# Patient Record
Sex: FEMALE | Race: BLACK OR AFRICAN AMERICAN | NOT HISPANIC OR LATINO | Employment: FULL TIME | URBAN - METROPOLITAN AREA
[De-identification: names, ages, dates, MRNs, and addresses within clinical notes are randomized per-mention and may not be internally consistent; named-entity substitution may affect disease eponyms.]

---

## 2017-01-17 ENCOUNTER — TELEPHONE (OUTPATIENT)
Dept: OBSTETRICS AND GYNECOLOGY | Facility: CLINIC | Age: 22
End: 2017-01-17

## 2017-01-17 NOTE — TELEPHONE ENCOUNTER
----- Message from Bhavani Marsh sent at 1/17/2017  3:45 PM CST -----  Contact: self [phone]  States that she is returning call please call back at [phone]//thank you acc

## 2017-01-30 ENCOUNTER — TELEPHONE (OUTPATIENT)
Dept: OBSTETRICS AND GYNECOLOGY | Facility: CLINIC | Age: 22
End: 2017-01-30

## 2017-01-30 ENCOUNTER — CLINICAL SUPPORT (OUTPATIENT)
Dept: OBSTETRICS AND GYNECOLOGY | Facility: CLINIC | Age: 22
End: 2017-01-30
Payer: COMMERCIAL

## 2017-01-30 PROCEDURE — 96372 THER/PROPH/DIAG INJ SC/IM: CPT | Mod: S$GLB,,, | Performed by: ADVANCED PRACTICE MIDWIFE

## 2017-01-30 RX ADMIN — MEDROXYPROGESTERONE ACETATE 150 MG: 150 INJECTION, SUSPENSION INTRAMUSCULAR at 02:01

## 2017-01-30 NOTE — TELEPHONE ENCOUNTER
Pt is requesting if she can be worked in sooner than first available, states she is still having pain from a procedure she had done and that her urine is still having a strong odor, please call pt.

## 2017-01-30 NOTE — PROGRESS NOTES
Two pt identifiers verified  pt notified to wait in clinic for 15 minutes after receiving injection, pt verbalized understanding  150mg/mL of Depo Provera administered IM to pt's left ventrogluteal.   Pt tolerated well  Next appointment scheduled for 04/24/2017 at 1045 with Catalina Hollins to discuss continuation of depo provera.

## 2017-03-06 ENCOUNTER — TELEPHONE (OUTPATIENT)
Dept: OBSTETRICS AND GYNECOLOGY | Facility: CLINIC | Age: 22
End: 2017-03-06

## 2017-03-06 NOTE — TELEPHONE ENCOUNTER
----- Message from Yanci Contreras sent at 3/3/2017  4:25 PM CST -----  Contact: [phone]   Pt would like a referral sent to [facility]rolog[facility].  Please call back @ [phone].  Thanks-[facility]

## 2017-03-06 NOTE — TELEPHONE ENCOUNTER
"Patient stated "we are playing around with her health and she wants to see someone else at [facility] and wants her records."  Patient advised to call [facility] and request an appointment and that they could request her records from our medical records department and that we do not send out records from the clinic.  Patient verbalized understanding.  "

## 2017-03-06 NOTE — TELEPHONE ENCOUNTER
----- Message from Aarti Montero sent at 3/6/2017  3:33 PM CST -----  Contact: Pt   Pt called and stated she was returning a call to the nurse. She stated she needs to get the info for the referral to [facility]. She can be reached at [phone] (home).    Thanks,  TF

## 2017-04-23 ENCOUNTER — HOSPITAL ENCOUNTER (EMERGENCY)
Facility: HOSPITAL | Age: 22
Discharge: HOME | End: 2017-04-23
Payer: COMMERCIAL

## 2017-04-23 VITALS
WEIGHT: 154 LBS | OXYGEN SATURATION: 99 % | BODY MASS INDEX: 26.29 KG/M2 | TEMPERATURE: 98 F | RESPIRATION RATE: 20 BRPM | HEART RATE: 71 BPM | DIASTOLIC BLOOD PRESSURE: 68 MMHG | HEIGHT: 64 IN | SYSTOLIC BLOOD PRESSURE: 144 MMHG

## 2017-04-23 DIAGNOSIS — N93.8 DYSFUNCTIONAL UTERINE BLEEDING: Primary | ICD-10-CM

## 2017-04-23 LAB
B-HCG UR QL: NEGATIVE
BACTERIA #/AREA URNS HPF: 0 /HPF
BASOPHILS # BLD AUTO: 0.02 K/UL
BASOPHILS NFR BLD: 0.3 %
BILIRUB UR QL STRIP: NEGATIVE
CLARITY UR: CLEAR
COLOR UR: YELLOW
DIFFERENTIAL METHOD: ABNORMAL
EOSINOPHIL # BLD AUTO: 0.2 K/UL
EOSINOPHIL NFR BLD: 2.8 %
ERYTHROCYTE [DISTWIDTH] IN BLOOD BY AUTOMATED COUNT: 13 %
GLUCOSE UR QL STRIP: NEGATIVE
HCT VFR BLD AUTO: 37 %
HGB BLD-MCNC: 12.6 G/DL
HGB UR QL STRIP: ABNORMAL
KETONES UR QL STRIP: NEGATIVE
LEUKOCYTE ESTERASE UR QL STRIP: NEGATIVE
LYMPHOCYTES # BLD AUTO: 3.7 K/UL
LYMPHOCYTES NFR BLD: 51.6 %
MCH RBC QN AUTO: 28.1 PG
MCHC RBC AUTO-ENTMCNC: 34.1 %
MCV RBC AUTO: 82 FL
MICROSCOPIC COMMENT: NORMAL
MONOCYTES # BLD AUTO: 0.4 K/UL
MONOCYTES NFR BLD: 5.9 %
NEUTROPHILS # BLD AUTO: 2.8 K/UL
NEUTROPHILS NFR BLD: 39.4 %
NITRITE UR QL STRIP: NEGATIVE
PH UR STRIP: 7 [PH] (ref 5–8)
PLATELET # BLD AUTO: 256 K/UL
PMV BLD AUTO: 10.9 FL
PROT UR QL STRIP: NEGATIVE
RBC # BLD AUTO: 4.49 M/UL
RBC #/AREA URNS HPF: 1 /HPF (ref 0–4)
SP GR UR STRIP: 1.02 (ref 1–1.03)
SQUAMOUS #/AREA URNS HPF: 3 /HPF
URN SPEC COLLECT METH UR: ABNORMAL
UROBILINOGEN UR STRIP-ACNC: 1 EU/DL
WBC # BLD AUTO: 7.17 K/UL
WBC #/AREA URNS HPF: 3 /HPF (ref 0–5)

## 2017-04-23 PROCEDURE — 87591 N.GONORRHOEAE DNA AMP PROB: CPT

## 2017-04-23 PROCEDURE — 99283 EMERGENCY DEPT VISIT LOW MDM: CPT

## 2017-04-23 PROCEDURE — 85025 COMPLETE CBC W/AUTO DIFF WBC: CPT

## 2017-04-23 PROCEDURE — 81000 URINALYSIS NONAUTO W/SCOPE: CPT

## 2017-04-23 PROCEDURE — 81025 URINE PREGNANCY TEST: CPT

## 2017-04-23 NOTE — ED AVS SNAPSHOT
[facility]  [address]  [place] [address]               Ayah Portillo Denisa   2017 12:38 AM   ED    Description:  Female : 1995   Department:  [facility]            Your Care was Coordinated By:     Provider Role From To    CAMILO Mancia Physician Assistant 17 0038 --      Reason for Visit     Female  Problem           Diagnoses this Visit        Comments    Dysfunctional uterine bleeding    -  Primary       ED Disposition     None           To Do List           Follow-up Information     Follow up with Anabela Stephen MD In 2 days.    Specialty:  Internal Medicine    Why:  As needed, If symptoms worsen return to ED     Contact information:    [address] [place] [address] DR Fernando VUONG [address]  [phone]        [facility]     [facility] Nurse Care Line -  Assistance  Unless otherwise directed by your provider, please contact [facility], our nurse care line that is available for  assistance.     Registered nurses in the [facility] provide: appointment scheduling, clinical advisement, health education, and other advisory services.  Call: [phone] (toll free)               Medications           Message regarding Medications     Verify the changes and/or additions to your medication regime listed below are the same as discussed with your clinician today.  If any of these changes or additions are incorrect, please notify your healthcare provider.             Verify that the below list of medications is an accurate representation of the medications you are currently taking.  If none reported, the list may be blank. If incorrect, please contact your healthcare provider. Carry this list with you in case of emergency.           Current Medications     medroxyPROGESTERone (DEPO-PROVERA) injection 150 mg Inject 1 mL (150 mg total) into the muscle every 3 (three) months.           Clinical  "Reference Information           Your Vitals Were     BP Pulse Temp Resp Height Weight    144/68 (BP Location: Right arm, Patient Position: Sitting) 71 97.6 °F (36.4 °C) (Temporal) 20 5' 4" (1.626 m) 69.9 kg (154 lb)    SpO2 BMI             99% 26.43 kg/m2         Allergies as of 4/23/2017     No Known Allergies      Immunizations Administered on Date of Encounter - 4/23/2017     None      ED Micro, Lab, POCT     Start Ordered       Status Ordering Provider    04/23/17 0114 04/23/17 0114  CBC auto differential  STAT      Final result     04/23/17 0041 04/23/17 0040  C. trachomatis/N. gonorrhoeae by AMP DNA Urine  STAT      In process     04/23/17 0039 04/23/17 0038  Urinalysis  STAT      Final result     04/23/17 0039 04/23/17 0038  Pregnancy, urine rapid (UPT)  Once      Final result     04/23/17 0038 04/23/17 0038  Urinalysis Microscopic  Once      Final result       ED Imaging Orders     None        Discharge Instructions         Dysfunctional Uterine Bleeding    Dysfunctional uterine bleeding is a condition in which bleeding is abnormal and occurs at unexpected times of the month. This happens because of changes in the hormones that help control a womans menstrual cycle each month.  The bleeding may be heavier or lighter than normal. If you have heavy bleeding often, this can lead to a problem called anemia. With anemia, your red blood cell count is too low. Red blood cells are needed because they help carry oxygen throughout your body. Severe anemia may cause you to look pale and feel very weak or tired. You might also become short of breath easily.  To treat dysfunctional uterine bleeding, medicines are often tried first. If these dont help, further testing and treatments may be needed. Discuss all of your options with your provider.  Home care  Medicines  If youre prescribed medicines, be sure to take them as directed. Some of the more common medicines you may be prescribed include:  · Hormone therapy " (Options include most methods of hormonal birth control such as pills, shots, or a hormone-releasing IUD)  · Nonsteroidal anti-inflammatory drugs (NSAIDs), such as ibuprofen  · Iron supplements, if you have anemia     General care  · Get plenty of rest if you tire easily. Avoid heavy exertion.  · To help relieve pain or cramping that may occur with bleeding, try using a heating pad on the lower belly or back. A warm bath may also help.  Follow-up care  Follow up with your healthcare provider as directed.  When to seek medical advice  Call your healthcare provider right away if:  · Bleeding becomes heavy (soaking 1 pad or tampon every hour for 3 hours)  · Increased abdominal pain  · Irregular bleeding worsens or does not get better even with treatment  · Fever of 100.4ºF (38ºC) or higher, or as directed by your provider  · Signs of anemia, such as pale skin, extreme fatigue or weakness, or shortness of breath  · Dizziness or fainting   Date Last Reviewed: 6/11/2015  © 5755-3626 [employer]. [address] [place] [address]. All rights reserved. This information is not intended as a substitute for professional medical care. Always follow your healthcare professional's instructions.          Your Scheduled Appointments     Apr 24, 2017  2:00 PM CDT   Established Patient Visit with CECILIA Tracy - OB/ GYN ([facility])    [address] [place] [id number] [phone]               [facility] complies with applicable Federal civil rights laws and does not discriminate on the basis of race, color, national origin, age, disability, or sex.        Language Assistance Services     ATTENTION: Language assistance services are available, free of charge. Please call [phone].      ATENCIÓN: Si habla español, tiene a anderson disposición servicios gratuitos de asistencia lingüística. Llame al [phone].     MAYUR Ý: N?u b?n nói Ti?ng Vi?t, có các  d?ch v? h? tr? joelle edwards? mi?n phí dành cho b?n. G?i s? [phone].

## 2017-04-23 NOTE — DISCHARGE INSTRUCTIONS
Dysfunctional Uterine Bleeding    Dysfunctional uterine bleeding is a condition in which bleeding is abnormal and occurs at unexpected times of the month. This happens because of changes in the hormones that help control a womans menstrual cycle each month.  The bleeding may be heavier or lighter than normal. If you have heavy bleeding often, this can lead to a problem called anemia. With anemia, your red blood cell count is too low. Red blood cells are needed because they help carry oxygen throughout your body. Severe anemia may cause you to look pale and feel very weak or tired. You might also become short of breath easily.  To treat dysfunctional uterine bleeding, medicines are often tried first. If these dont help, further testing and treatments may be needed. Discuss all of your options with your provider.  Home care  Medicines  If youre prescribed medicines, be sure to take them as directed. Some of the more common medicines you may be prescribed include:  · Hormone therapy (Options include most methods of hormonal birth control such as pills, shots, or a hormone-releasing IUD)  · Nonsteroidal anti-inflammatory drugs (NSAIDs), such as ibuprofen  · Iron supplements, if you have anemia     General care  · Get plenty of rest if you tire easily. Avoid heavy exertion.  · To help relieve pain or cramping that may occur with bleeding, try using a heating pad on the lower belly or back. A warm bath may also help.  Follow-up care  Follow up with your healthcare provider as directed.  When to seek medical advice  Call your healthcare provider right away if:  · Bleeding becomes heavy (soaking 1 pad or tampon every hour for 3 hours)  · Increased abdominal pain  · Irregular bleeding worsens or does not get better even with treatment  · Fever of 100.4ºF (38ºC) or higher, or as directed by your provider  · Signs of anemia, such as pale skin, extreme fatigue or weakness, or shortness of breath  · Dizziness or  fainting   Date Last Reviewed: 6/11/2015  © 7039-8058 [employer]. [address] [place] [address], [place] [address]. All rights reserved. This information is not intended as a substitute for professional medical care. Always follow your healthcare professional's instructions.

## 2017-04-23 NOTE — ED PROVIDER NOTES
Encounter Date: 4/23/2017       History     Chief Complaint   Patient presents with    Female  Problem     pt reports she has been on depo shot for over a year and is currently having heavy bleeding; pt also c/o string urine smell     Review of patient's allergies indicates:  No Known Allergies  HPI Comments: Pt reports to ED c/o vaginal bleeding. Pt says she has a hx of heavy bleeding and bad menstrual cramping that has been ongoing for several years. Pt started the depo shot over a year ago to help these sxs, but pt reports no improvement.  Pt receives her depo shot every 3 months as scheduled and has never missed a dose. Pt has repeatedly told the nurse at these office visits that she is still having bleeding and cramping, but she has never seen the provider again since the initial visit. Pt has decided to change providers and will be seeing her new provider at [facility] this coming Friday on April 28th and is scheduled for an ultrasound.  Pt presents to ED tonight b/c bleeding has become heavier than normal and she says she is having to change her tampon every hour or two, however, the bleeding does not last all day.  Pt denies any dizziness, CP, SOB, fever, nausea, vomiting, or any other associated sxs.  Pt is also c/o a strong odor associated with her urine. Pt says this has been an ongoing problem and often times her urine screens are negative. Pt says urine smells like wine, but denies any increase in frequency, dysuria, or any other sxs.       The history is provided by the patient.     Past Medical History:   Diagnosis Date    Recurrent UTI      History reviewed. No pertinent surgical history.  Family History   Problem Relation Age of Onset    Breast cancer Maternal Grandmother     Kidney disease Maternal Grandmother     Heart disease Maternal Grandmother     Breast cancer Maternal Aunt     Kidney disease Maternal Aunt     Hypertension Mother     Kidney disease Maternal Aunt      Social  History   Substance Use Topics    Smoking status: Never Smoker    Smokeless tobacco: Never Used    Alcohol use No     Review of Systems   Constitutional: Negative for fever.   HENT: Negative for sore throat.    Respiratory: Negative for shortness of breath.    Cardiovascular: Negative for chest pain.   Gastrointestinal: Negative for nausea.   Genitourinary: Positive for menstrual problem, pelvic pain (occassional cramping) and vaginal bleeding. Negative for dysuria, frequency, vaginal discharge and vaginal pain.        Strong odor to urine.    Musculoskeletal: Negative for back pain.   Skin: Negative for rash.   Neurological: Negative for weakness.   Hematological: Does not bruise/bleed easily.   All other systems reviewed and are negative.      Physical Exam   Initial Vitals   BP Pulse Resp Temp SpO2   04/23/17 0033 04/23/17 0033 04/23/17 0033 04/23/17 0033 04/23/17 0033   144/68 71 20 97.6 °F (36.4 °C) 99 %     Physical Exam    Nursing note and vitals reviewed.  Constitutional: She appears well-developed and well-nourished.   HENT:   Head: Normocephalic and atraumatic.   Eyes: EOM are normal. Pupils are equal, round, and reactive to light.   Neck: Normal range of motion. Neck supple.   Cardiovascular: Normal rate and regular rhythm.   Pulmonary/Chest: Breath sounds normal.   Abdominal: Soft. She exhibits no distension. There is no tenderness. There is no rebound and no guarding.   Genitourinary:   Genitourinary Comments: Pelvic deferred.    Musculoskeletal: Normal range of motion.   Neurological: She is alert and oriented to person, place, and time.         ED Course   Procedures  Labs Reviewed   URINALYSIS - Abnormal; Notable for the following:        Result Value    Occult Blood UA 1+ (*)     All other components within normal limits   CBC W/ AUTO DIFFERENTIAL - Abnormal; Notable for the following:     Lymph% 51.6 (*)     All other components within normal limits   C. TRACHOMATIS/N. GONORRHOEAE BY AMP DNA    PREGNANCY TEST, URINE RAPID   URINALYSIS MICROSCOPIC              Results for orders placed or performed during the hospital encounter of 04/23/17   Urinalysis   Result Value Ref Range    Specimen UA Urine, Clean Catch     Color, UA Yellow Yellow, Straw, June    Appearance, UA Clear Clear    pH, UA 7.0 5.0 - 8.0    Specific Gravity, UA 1.020 1.005 - 1.030    Protein, UA Negative Negative    Glucose, UA Negative Negative    Ketones, UA Negative Negative    Bilirubin (UA) Negative Negative    Occult Blood UA 1+ (A) Negative    Nitrite, UA Negative Negative    Urobilinogen, UA 1.0 <2.0 EU/dL    Leukocytes, UA Negative Negative   Pregnancy, urine rapid (UPT)   Result Value Ref Range    Preg Test, Ur Negative    Urinalysis Microscopic   Result Value Ref Range    RBC, UA 1 0 - 4 /hpf    WBC, UA 3 0 - 5 /hpf    Bacteria, UA 0 None-Occ /hpf    Squam Epithel, UA 3 /hpf    Microscopic Comment SEE COMMENT    CBC auto differential   Result Value Ref Range    WBC 7.17 3.90 - 12.70 K/uL    RBC 4.49 4.00 - 5.40 M/uL    Hemoglobin 12.6 12.0 - 16.0 g/dL    Hematocrit 37.0 37.0 - 48.5 %    MCV 82 82 - 98 fL    MCH 28.1 27.0 - 31.0 pg    MCHC 34.1 32.0 - 36.0 %    RDW 13.0 11.5 - 14.5 %    Platelets 256 150 - 350 K/uL    MPV 10.9 9.2 - 12.9 fL    Gran # 2.8 1.8 - 7.7 K/uL    Lymph # 3.7 1.0 - 4.8 K/uL    Mono # 0.4 0.3 - 1.0 K/uL    Eos # 0.2 0.0 - 0.5 K/uL    Baso # 0.02 0.00 - 0.20 K/uL    Gran% 39.4 38.0 - 73.0 %    Lymph% 51.6 (H) 18.0 - 48.0 %    Mono% 5.9 4.0 - 15.0 %    Eosinophil% 2.8 0.0 - 8.0 %    Basophil% 0.3 0.0 - 1.9 %    Differential Method Automated                          ED Course     Clinical Impression:   The encounter diagnosis was Dysfunctional uterine bleeding.          CAMILO Mancia  04/23/17 0149

## 2017-04-24 LAB
C TRACH DNA SPEC QL NAA+PROBE: NOT DETECTED
N GONORRHOEA DNA SPEC QL NAA+PROBE: NOT DETECTED

## 2017-04-25 ENCOUNTER — OFFICE VISIT (OUTPATIENT)
Dept: OBSTETRICS AND GYNECOLOGY | Facility: CLINIC | Age: 22
End: 2017-04-25
Payer: COMMERCIAL

## 2017-04-25 VITALS
HEIGHT: 64 IN | BODY MASS INDEX: 26.49 KG/M2 | DIASTOLIC BLOOD PRESSURE: 70 MMHG | SYSTOLIC BLOOD PRESSURE: 112 MMHG | WEIGHT: 155.19 LBS

## 2017-04-25 DIAGNOSIS — N92.0 MENORRHAGIA WITH REGULAR CYCLE: Primary | ICD-10-CM

## 2017-04-25 PROCEDURE — 99999 PR PBB SHADOW E&M-EST. PATIENT-LVL II: CPT | Mod: PBBFAC,,, | Performed by: NURSE PRACTITIONER

## 2017-04-25 PROCEDURE — 1160F RVW MEDS BY RX/DR IN RCRD: CPT | Mod: S$GLB,,, | Performed by: NURSE PRACTITIONER

## 2017-04-25 PROCEDURE — 99213 OFFICE O/P EST LOW 20 MIN: CPT | Mod: S$GLB,,, | Performed by: NURSE PRACTITIONER

## 2017-04-25 NOTE — PROGRESS NOTES
"CC: Discuss birth control    Ayah Crawley is a 22 y.o. female  presents for well woman exam.  Patient is on depo-provera for menorrhagia, reports that the depo made her cycles more painful. Never had a a pap exam.     Past Medical History:   Diagnosis Date    Recurrent UTI      History reviewed. No pertinent surgical history.  Social History     Social History    Marital status: Single     Spouse name: N/A    Number of children: N/A    Years of education: N/A     Occupational History    Not on file.     Social History Main Topics    Smoking status: Never Smoker    Smokeless tobacco: Never Used    Alcohol use No    Drug use: No    Sexual activity: Yes     Partners: Male     Birth control/ protection: Injection     Other Topics Concern    Not on file     Social History Narrative    Works at [employer] in [employer].     Family History   Problem Relation Age of Onset    Breast cancer Maternal Grandmother     Kidney disease Maternal Grandmother     Heart disease Maternal Grandmother     Breast cancer Maternal Aunt     Kidney disease Maternal Aunt     Hypertension Mother     Kidney disease Maternal Aunt      OB History      Para Term  AB TAB SAB Ectopic Multiple Living    0 0 0 0 0 0 0 0 0 0          /70  Ht 5' 4" (1.626 m)  Wt 70.4 kg (155 lb 3.3 oz)  BMI 26.64 kg/m2      ROS:  GENERAL: Denies weight gain or weight loss. Feeling well overall.   SKIN: Denies rash or lesions.   HEAD: Denies head injury or headache.   NODES: Denies enlarged lymph nodes.   CHEST: Denies chest pain or shortness of breath.   CARDIOVASCULAR: Denies palpitations or left sided chest pain.   ABDOMEN: No abdominal pain, constipation, diarrhea, nausea, vomiting or rectal bleeding.   URINARY: No frequency, dysuria, hematuria, or burning on urination.  REPRODUCTIVE: See HPI.   BREASTS: The patient performs breast self-examination and denies pain, lumps, or nipple discharge.   HEMATOLOGIC: " No easy bruisability or excessive bleeding.   MUSCULOSKELETAL: Denies joint pain or swelling.   NEUROLOGIC: Denies syncope or weakness.   PSYCHIATRIC: Denies depression, anxiety or mood swings.    PHYSICAL EXAM:  APPEARANCE: Well nourished, well developed, in no acute distress.  AFFECT: WNL, alert and oriented x 3  SKIN: No acne or hirsutism  NECK: Neck symmetric without masses or thyromegaly  NODES: No inguinal, cervical, axillary, or femoral lymph node enlargement  CHEST: Good respiratory effect  ABDOMEN: Soft.  No tenderness or masses.       PLAN:  I discussed birth control options and information was given. Patient to RTC for pap exam and to follow-up on birth control that she wants to proceed with.   Patient was counseled today on A.C.S. Pap guidelines and recommendations for yearly pelvic exams, mammograms and monthly self breast exams; to see her PCP for other health maintenance.

## 2017-05-01 ENCOUNTER — TELEPHONE (OUTPATIENT)
Dept: OBSTETRICS AND GYNECOLOGY | Facility: CLINIC | Age: 22
End: 2017-05-01

## 2017-05-01 ENCOUNTER — CLINICAL SUPPORT (OUTPATIENT)
Dept: OBSTETRICS AND GYNECOLOGY | Facility: CLINIC | Age: 22
End: 2017-05-01
Payer: COMMERCIAL

## 2017-05-01 PROCEDURE — 96372 THER/PROPH/DIAG INJ SC/IM: CPT | Mod: S$GLB,,, | Performed by: NURSE PRACTITIONER

## 2017-05-01 RX ADMIN — MEDROXYPROGESTERONE ACETATE 150 MG: 150 INJECTION, SUSPENSION INTRAMUSCULAR at 03:05

## 2017-05-01 NOTE — TELEPHONE ENCOUNTER
----- Message from Shonda Manuel sent at 5/1/2017 12:22 PM CDT -----  Please call pt at [phone] in regards to pt needing a depo shot on today.

## 2017-05-01 NOTE — PROGRESS NOTES
Pt. Came in for depo provera injection . Pt. Tolerated well. Instructed patient to wait in clinic 15 mins. Pt. Voiced understanding. Made next appt. And gave copy of AVS.

## 2017-05-01 NOTE — TELEPHONE ENCOUNTER
Pt requesting to receive depo provera today, today is the last day pt can receive injection, pt scheduled for 5/1/17 at 3pm. Pt verbalized understanding.

## 2017-07-14 ENCOUNTER — HOSPITAL ENCOUNTER (EMERGENCY)
Facility: HOSPITAL | Age: 22
Discharge: HOME | End: 2017-07-14
Payer: COMMERCIAL

## 2017-07-14 VITALS
SYSTOLIC BLOOD PRESSURE: 142 MMHG | WEIGHT: 148 LBS | BODY MASS INDEX: 25.27 KG/M2 | TEMPERATURE: 98 F | RESPIRATION RATE: 18 BRPM | DIASTOLIC BLOOD PRESSURE: 73 MMHG | HEART RATE: 73 BPM | HEIGHT: 64 IN | OXYGEN SATURATION: 99 %

## 2017-07-14 DIAGNOSIS — R07.9 CHEST PAIN: ICD-10-CM

## 2017-07-14 DIAGNOSIS — R51.9 NONINTRACTABLE HEADACHE, UNSPECIFIED CHRONICITY PATTERN, UNSPECIFIED HEADACHE TYPE: ICD-10-CM

## 2017-07-14 DIAGNOSIS — M94.0 COSTOCHONDRITIS: Primary | ICD-10-CM

## 2017-07-14 PROCEDURE — 93005 ELECTROCARDIOGRAM TRACING: CPT

## 2017-07-14 PROCEDURE — 99284 EMERGENCY DEPT VISIT MOD MDM: CPT | Mod: 25

## 2017-07-14 PROCEDURE — 93010 ELECTROCARDIOGRAM REPORT: CPT | Mod: ,,, | Performed by: INTERNAL MEDICINE

## 2017-07-14 RX ORDER — DICLOFENAC SODIUM 75 MG/1
75 TABLET, DELAYED RELEASE ORAL 2 TIMES DAILY
Qty: 20 TABLET | Refills: 0 | Status: SHIPPED | OUTPATIENT
Start: 2017-07-14

## 2017-07-14 RX ORDER — BUTALBITAL, ACETAMINOPHEN AND CAFFEINE 50; 325; 40 MG/1; MG/1; MG/1
1 TABLET ORAL EVERY 4 HOURS PRN
Qty: 15 TABLET | Refills: 0 | Status: SHIPPED | OUTPATIENT
Start: 2017-07-14

## 2017-07-14 NOTE — ED PROVIDER NOTES
SCRIBE #1 NOTE: I, Harrison Dorsey, am scribing for, and in the presence of, CAMILO Kee. I have scribed the entire note.      History      Chief Complaint   Patient presents with    Chest Pain     pt reports L sided chest pain under L breast onset this morning. Has been ongoing intermittently x 2 weeks.  No sob       Review of patient's allergies indicates:  No Known Allergies     HPI   HPI    7/14/2017, 5:54 PM   History obtained from the patient      History of Present Illness: Ayah Crawley is a 22 y.o. female patient who presents to the Emergency Department for an evaluation of left sided chest pain which onset gradually x2 weeks ago. Pt describes sensation as a sharp stabbing sensation. Pt reports she is also suffering from pain in her left shoulder and neck. Pt reports hx of asthma. Symptoms are intermittent and moderate in severity. Exacerbated by deep breathing and relieved by nothing. Associated sxs include HA, left shoulder pain and neck pain. Patient denies any fever, chills, congestion, cough, SOB, abdominal pain, N/V/D, palpitations, leg swelling, dysuria, hematuria,dizziness, lightheadedness, and all other sxs at this time. No further complaints or concerns at this time.     Arrival mode: Personal vehicle    PCP: Anabela Stephen MD       Past Medical History:  Past Medical History:   Diagnosis Date    Recurrent UTI        Past Surgical History:  No past surgical history on file.      Family History:  Family History   Problem Relation Age of Onset    Breast cancer Maternal Grandmother     Kidney disease Maternal Grandmother     Heart disease Maternal Grandmother     Breast cancer Maternal Aunt     Kidney disease Maternal Aunt     Hypertension Mother     Kidney disease Maternal Aunt        Social History:  Social History     Social History Main Topics    Smoking status: Never Smoker    Smokeless tobacco: Never Used    Alcohol use No    Drug use: No    Sexual activity: Yes      Partners: Male     Birth control/ protection: Injection       ROS   Review of Systems   Constitutional: Negative for chills, diaphoresis and fever.   HENT: Negative for congestion, sore throat and trouble swallowing.    Respiratory: Negative for cough, chest tightness and shortness of breath.    Cardiovascular: Positive for chest pain. Negative for palpitations and leg swelling.   Gastrointestinal: Negative for abdominal pain, diarrhea, nausea and vomiting.   Genitourinary: Negative for decreased urine volume, difficulty urinating, dysuria, frequency and urgency.   Musculoskeletal: Positive for arthralgias (Left shoulder) and neck pain (Left sided). Negative for back pain, joint swelling and neck stiffness.   Skin: Negative for rash.   Neurological: Positive for headaches. Negative for dizziness, weakness and light-headedness.   Hematological: Does not bruise/bleed easily.     Physical Exam      Initial Vitals [07/14/17 1654]   BP Pulse Resp Temp SpO2   (!) 142/73 73 18 98.3 °F (36.8 °C) 99 %      MAP       96          Physical Exam  Nursing Notes and Vital Signs Reviewed.  Constitutional: Patient is in no acute distress. Well-developed and well-nourished.  Head: Atraumatic. Normocephalic.  Eyes: PERRL. EOM intact. Conjunctivae are not pale. No scleral icterus.  ENT: Mucous membranes are moist. Oropharynx is clear and symmetric.    Neck: Supple. Full ROM. No lymphadenopathy. Mild cervical paraspinal tenderness to palpation noted.   Cardiovascular: Regular rate. Regular rhythm. No murmurs, rubs, or gallops. Distal pulses are 2+ and symmetric.  Pulmonary/Chest: No respiratory distress. Clear to auscultation bilaterally. No wheezing, rales, or rhonchi. Mild tenderness to palpation to left chest wall.   Abdominal: Soft and non-distended.  There is no tenderness.   Musculoskeletal: Moves all extremities. No obvious deformities. No edema. No calf tenderness.  Skin: Warm and dry.  Neurological:  Alert, awake, and  "appropriate.  Normal speech.  No acute focal neurological deficits are appreciated.  Psychiatric: Normal affect. Good eye contact. Appropriate in content.    ED Course    Procedures  ED Vital Signs:  Vitals:    07/14/17 1654   BP: (!) 142/73   Pulse: 73   Resp: 18   Temp: 98.3 °F (36.8 °C)   TempSrc: Oral   SpO2: 99%   Weight: 67.1 kg (148 lb)   Height: 5' 4" (1.626 m)       Abnormal Lab Results:  Labs Reviewed - No data to display     All Lab Results:  Results for orders placed or performed during the hospital encounter of 04/23/17   C. trachomatis/N. gonorrhoeae by AMP DNA Urine   Result Value Ref Range    Chlamydia, Amplified DNA Not Detected Not Detected    N gonorrhoeae, amplified DNA Not Detected Not Detected   Urinalysis   Result Value Ref Range    Specimen UA Urine, Clean Catch     Color, UA Yellow Yellow, Straw, June    Appearance, UA Clear Clear    pH, UA 7.0 5.0 - 8.0    Specific Gravity, UA 1.020 1.005 - 1.030    Protein, UA Negative Negative    Glucose, UA Negative Negative    Ketones, UA Negative Negative    Bilirubin (UA) Negative Negative    Occult Blood UA 1+ (A) Negative    Nitrite, UA Negative Negative    Urobilinogen, UA 1.0 <2.0 EU/dL    Leukocytes, UA Negative Negative   Pregnancy, urine rapid (UPT)   Result Value Ref Range    Preg Test, Ur Negative    Urinalysis Microscopic   Result Value Ref Range    RBC, UA 1 0 - 4 /hpf    WBC, UA 3 0 - 5 /hpf    Bacteria, UA 0 None-Occ /hpf    Squam Epithel, UA 3 /hpf    Microscopic Comment SEE COMMENT    CBC auto differential   Result Value Ref Range    WBC 7.17 3.90 - 12.70 K/uL    RBC 4.49 4.00 - 5.40 M/uL    Hemoglobin 12.6 12.0 - 16.0 g/dL    Hematocrit 37.0 37.0 - 48.5 %    MCV 82 82 - 98 fL    MCH 28.1 27.0 - 31.0 pg    MCHC 34.1 32.0 - 36.0 %    RDW 13.0 11.5 - 14.5 %    Platelets 256 150 - 350 K/uL    MPV 10.9 9.2 - 12.9 fL    Gran # 2.8 1.8 - 7.7 K/uL    Lymph # 3.7 1.0 - 4.8 K/uL    Mono # 0.4 0.3 - 1.0 K/uL    Eos # 0.2 0.0 - 0.5 K/uL    Baso # " 0.02 0.00 - 0.20 K/uL    Gran% 39.4 38.0 - 73.0 %    Lymph% 51.6 (H) 18.0 - 48.0 %    Mono% 5.9 4.0 - 15.0 %    Eosinophil% 2.8 0.0 - 8.0 %    Basophil% 0.3 0.0 - 1.9 %    Differential Method Automated          Imaging Results:  Imaging Results          X-Ray Chest PA And Lateral (Final result)  Result time 07/14/17 18:28:47    Final result by Charity Cordova MD (07/14/17 18:28:47)                 Impression:         Negative two-view chest x-ray.      Electronically signed by: CHARITY CORDOVA MD  Date:     07/14/17  Time:    18:28              Narrative:    XR CHEST PA AND LATERAL, 07/14/17 18:20:43    Clinical indication: Acute chest pain    Findings:   Heart size is normal. The lung fields are clear. No acute pulmonary infiltrate.                                  The EKG was ordered, reviewed, and independently interpreted by the ED provider.  Interpretation time: 17:06  Rate: 66 BPM  Rhythm: normal sinus rhythm  Interpretation: No STEMI.    The Emergency Provider reviewed the vital signs and test results, which are outlined above.    ED Discussion     6:48 PM: Reassessed pt at this time. Pt is awake, alert, and in NAD. Pt states her condition has improved at this time. Discussed with pt all pertinent ED information and results. Discussed pt dx of costochondritis and plan of tx. Gave pt all f/u and return to the ED instructions. All questions and concerns were addressed at this time. Pt expresses understanding of information and instructions, and is comfortable with plan to discharge. Pt is stable for discharge.    I discussed with patient and/or family/caretaker that evaluation in the ED does not suggest any emergent or life threatening medical conditions requiring immediate intervention beyond what was provided in the ED, and I believe patient is safe for discharge.  Regardless, an unremarkable evaluation in the ED does not preclude the development or presence of a serious of life threatening condition.  As such, patient was instructed to return immediately for any worsening or change in current symptoms.      ED Medication(s):  Medications - No data to display    Discharge Medication List as of 7/14/2017  6:37 PM      START taking these medications    Details   butalbital-acetaminophen-caffeine -40 mg (FIORICET, ESGIC) -40 mg per tablet Take 1 tablet by mouth every 4 (four) hours as needed for Pain or Headaches., Starting Fri 7/14/2017, Print      diclofenac (VOLTAREN) 75 MG EC tablet Take 1 tablet (75 mg total) by mouth 2 (two) times daily., Starting Fri 7/14/2017, Print             Follow-up Information     Anabela Stephen MD In 1 day.    Specialty:  Internal Medicine  Contact information:  [address] [place] [address] DR Fernando [place] [address] [phone]                     Medical Decision Making    Medical Decision Making:   Clinical Tests:   Lab Tests: Ordered and Reviewed  Radiological Study: Ordered and Reviewed  Medical Tests: Ordered and Reviewed           Scribe Attestation:   Scribe #1: I performed the above scribed service and the documentation accurately describes the services I performed. I attest to the accuracy of the note.    Attending:   Physician Attestation Statement for Scribe #1: I, CAMILO Kee, personally performed the services described in this documentation, as scribed by Harrison Dorsey, in my presence, and it is both accurate and complete.          Clinical Impression       ICD-10-CM ICD-9-CM   1. Costochondritis M94.0 733.6   2. Chest pain R07.9 786.50   3. Nonintractable headache, unspecified chronicity pattern, unspecified headache type R51 784.0       Disposition:   Disposition: Discharged  Condition: Stable         CAMILO Wilson  07/26/17 0819

## 2017-07-18 ENCOUNTER — TELEPHONE (OUTPATIENT)
Dept: OBSTETRICS AND GYNECOLOGY | Facility: CLINIC | Age: 22
End: 2017-07-18

## 2017-07-25 ENCOUNTER — OFFICE VISIT (OUTPATIENT)
Dept: OBSTETRICS AND GYNECOLOGY | Facility: CLINIC | Age: 22
End: 2017-07-25
Payer: COMMERCIAL

## 2017-07-25 VITALS
DIASTOLIC BLOOD PRESSURE: 74 MMHG | HEIGHT: 64 IN | SYSTOLIC BLOOD PRESSURE: 108 MMHG | BODY MASS INDEX: 25.75 KG/M2 | WEIGHT: 150.81 LBS

## 2017-07-25 DIAGNOSIS — Z30.011 ENCOUNTER FOR INITIAL PRESCRIPTION OF CONTRACEPTIVE PILLS: Primary | ICD-10-CM

## 2017-07-25 PROCEDURE — 99999 PR PBB SHADOW E&M-EST. PATIENT-LVL III: CPT | Mod: PBBFAC,,, | Performed by: NURSE PRACTITIONER

## 2017-07-25 PROCEDURE — 88175 CYTOPATH C/V AUTO FLUID REDO: CPT

## 2017-07-25 PROCEDURE — 99213 OFFICE O/P EST LOW 20 MIN: CPT | Mod: S$GLB,,, | Performed by: NURSE PRACTITIONER

## 2017-07-25 RX ORDER — NORGESTIMATE AND ETHINYL ESTRADIOL 0.25-0.035
1 KIT ORAL DAILY
Qty: 28 TABLET | Refills: 11 | Status: SHIPPED | OUTPATIENT
Start: 2017-07-25 | End: 2017-12-20 | Stop reason: SDUPTHER

## 2017-07-25 NOTE — PROGRESS NOTES
"CC: Well woman exam    Ayah Crawley is a 22 y.o. female  presents for well woman exam.  LMP: No LMP recorded..  No issues, problems, or complaints. Cycles are every 26-28 days and not heavy. Patient was on depo-provera and wants to discuss different BC, depo caused to have heavy cycles. Never had a pap exam.      Past Medical History:   Diagnosis Date    Recurrent UTI      History reviewed. No pertinent surgical history.  Social History     Social History    Marital status: Single     Spouse name: N/A    Number of children: N/A    Years of education: N/A     Occupational History    Not on file.     Social History Main Topics    Smoking status: Never Smoker    Smokeless tobacco: Never Used    Alcohol use No    Drug use: No    Sexual activity: Yes     Partners: Male     Birth control/ protection: Injection     Other Topics Concern    Not on file     Social History Narrative    Works at [employer] in [place].     Family History   Problem Relation Age of Onset    Breast cancer Maternal Grandmother     Kidney disease Maternal Grandmother     Heart disease Maternal Grandmother     Breast cancer Maternal Aunt     Kidney disease Maternal Aunt     Hypertension Mother     Kidney disease Maternal Aunt      OB History      Para Term  AB Living    0 0 0 0 0 0    SAB TAB Ectopic Multiple Live Births    0 0 0 0            /74   Ht 5' 4" (1.626 m)   Wt 68.4 kg (150 lb 12.7 oz)   BMI 25.88 kg/m²       ROS:  GENERAL: Denies weight gain or weight loss. Feeling well overall.   SKIN: Denies rash or lesions.   HEAD: Denies head injury or headache.   NODES: Denies enlarged lymph nodes.   CHEST: Denies chest pain or shortness of breath.   CARDIOVASCULAR: Denies palpitations or left sided chest pain.   ABDOMEN: No abdominal pain, constipation, diarrhea, nausea, vomiting or rectal bleeding.   URINARY: No frequency, dysuria, hematuria, or burning on urination.  REPRODUCTIVE: See " HPI.   BREASTS: The patient performs breast self-examination and denies pain, lumps, or nipple discharge.   HEMATOLOGIC: No easy bruisability or excessive bleeding.   MUSCULOSKELETAL: Denies joint pain or swelling.   NEUROLOGIC: Denies syncope or weakness.   PSYCHIATRIC: Denies depression, anxiety or mood swings.    PHYSICAL EXAM:  APPEARANCE: Well nourished, well developed, in no acute distress.  AFFECT: WNL, alert and oriented x 3  SKIN: No acne or hirsutism  NECK: Neck symmetric without masses or thyromegaly  NODES: No inguinal, cervical, axillary, or femoral lymph node enlargement  CHEST: Good respiratory effect  ABDOMEN: Soft.  No tenderness or masses.    PELVIC: Normal external genitalia without lesions.  Normal hair distribution.  Adequate perineal body, normal urethral meatus.  Vagina moist and well rugated without lesions or discharge.  Cervix pink, without lesions, discharge or tenderness.   Bimanual exam shows uterus to be normal size, regular, mobile and nontender.  Adnexa without masses or tenderness.    EXTREMITIES: No edema.    1. Encounter for initial prescription of contraceptive pills  Liquid-based pap smear, screening    norgestimate-ethinyl estradiol (ORTHO-CYCLEN) 0.25-35 mg-mcg per tablet    PLAN:  Pap exam  OCP  I spent 20 minutes with this patient explaining BC options.      Patient was counseled today on A.C.S. Pap guidelines and recommendations for yearly pelvic exams, mammograms and monthly self breast exams; to see her PCP for other health maintenance.

## 2017-07-25 NOTE — PATIENT INSTRUCTIONS
Birth Control Methods  Birth control methods are used to help prevent pregnancy. There are many different methods to choose from. Talk to your healthcare provider about which method is right for you. Be sure to ask your provider about the effectiveness of each method. Also ask about the benefits, risks, and side effects of each method.  Hormones  Some birth control methods work by releasing hormones such as progestin and estrogen. These methods include: hormone implants, hormone shots, the vaginal ring, the patch, and birth control pills. They all work by stopping ovulation (release of the egg from the ovary). The implant is a small device that needs to be placed in the upper arm by a trained healthcare provider. It works for up to 3 years. Hormone injections must be repeated every 3 months. The vaginal ring must be replaced monthly (it can be removed during the fourth week of each cycle). The patch must be replaced weekly (it is not worn during the fourth week of each cycle). Birth control pills must be taken every day. Note that all of these methods are effective and can be stopped at any time.  Intrauterine Device (IUD)  An IUD is a small, T-shaped device. It must be placed in the uterus by a trained healthcare provider. There are different types of IUDs available. They work by causing changes in the uterus that make it harder for sperm to reach the egg. Depending on the type of IUD you have, it may work for several years or longer. The IUD is a reversible birth control method. This means it can be removed at any time.  Condom  A condom is a sheath that forms a thin barrier between the penis and the vagina. It helps prevent pregnancy by keeping sperm from entering the vagina. When latex condoms are used, they have the added benefit of protecting against most STDs (sexually transmitted diseases). Condoms should be discarded after each use. Ask your healthcare provider about the different types of condoms  available. These include both the male condom and female condom.  Spermicide  Spermicides come as foams, jellies, creams, suppositories, and tablets.  They help prevent pregnancy by killing sperm. When used alone they are not that reliable. They work best when combined with other birth control methods such as diaphragms and cervical caps.  Sponge, Diaphragm, and Cervical Cap  All of these methods help prevent pregnancy by covering the opening of the uterus (cervix). This prevents sperm from passing through.  The sponge contains spermicide. It can be bought over the counter. The sponge must be left in place for at least 6 hours after the last time you have sex. However, it should not stay in place for more than 24 hours. It should be discarded after it is used.  The diaphragm and cervical cap must be fitted and prescribed by your healthcare provider. Both are used with spermicide. The diaphragm must be left in place for at least 6 hours after sex. However, it should not stay in place for more than 24 hours. It can be washed and reused. The cervical cap must be left in place for at least 6 hours after sex. However, it should not stay in place for more than 48 hours. It can be washed and reused.  Withdrawal Method  This is when the man pulls his penis out of the vagina just before ejaculation (coming). This lowers the amount of sperm entering the vagina. Be aware that fluids released just before ejaculation often still contain some sperm, so this method is not as reliable as certain other methods.  Rhythm Method  This method requires that you know when in your menstrual cycle you are likely to become pregnant. Then, you avoid sex during those days. This requires careful planning and good discipline. Your healthcare provider can explain more about how this works.  Tubal Ligation and Vasectomy  These are surgical methods to prevent pregnancy. Tubal ligation is an option for women. The fallopian tubes are blocked or cut  (ligated). This keeps the egg from passing into the uterus or sperm from reaching the egg. Vasectomy is an option for men. The tubes that normally carry sperm to the penis are either closed or blocked. Both tubal ligation and vasectomy are permanent both control methods. This means reversal is either not possible or unlikely to work. They are good choices for women and men who know that they do not want to have children in the future.  Date Last Reviewed: 6/11/2015 © 2000-2016 "[employer]. [address] [place] [address], [place] [address]. All rights reserved. This information is not intended as a substitute for professional medical care. Always follow your healthcare professional's instructions.

## 2017-09-23 ENCOUNTER — HOSPITAL ENCOUNTER (EMERGENCY)
Facility: HOSPITAL | Age: 22
Discharge: HOME | End: 2017-09-23
Payer: COMMERCIAL

## 2017-09-23 VITALS
OXYGEN SATURATION: 100 % | HEART RATE: 64 BPM | BODY MASS INDEX: 23.9 KG/M2 | DIASTOLIC BLOOD PRESSURE: 72 MMHG | RESPIRATION RATE: 18 BRPM | SYSTOLIC BLOOD PRESSURE: 110 MMHG | HEIGHT: 64 IN | WEIGHT: 140 LBS | TEMPERATURE: 99 F

## 2017-09-23 DIAGNOSIS — R10.2 SUPRAPUBIC PAIN: Primary | ICD-10-CM

## 2017-09-23 LAB
B-HCG UR QL: NEGATIVE
BACTERIA #/AREA URNS HPF: ABNORMAL /HPF
BILIRUB UR QL STRIP: NEGATIVE
CLARITY UR: CLEAR
COLOR UR: YELLOW
GLUCOSE UR QL STRIP: NEGATIVE
HGB UR QL STRIP: NEGATIVE
KETONES UR QL STRIP: NEGATIVE
LEUKOCYTE ESTERASE UR QL STRIP: ABNORMAL
MICROSCOPIC COMMENT: ABNORMAL
NITRITE UR QL STRIP: NEGATIVE
PH UR STRIP: 7 [PH] (ref 5–8)
PROT UR QL STRIP: NEGATIVE
RBC #/AREA URNS HPF: 0 /HPF (ref 0–4)
SP GR UR STRIP: 1.01 (ref 1–1.03)
SQUAMOUS #/AREA URNS HPF: 2 /HPF
URN SPEC COLLECT METH UR: ABNORMAL
UROBILINOGEN UR STRIP-ACNC: NEGATIVE EU/DL
WBC #/AREA URNS HPF: 2 /HPF (ref 0–5)

## 2017-09-23 PROCEDURE — 99283 EMERGENCY DEPT VISIT LOW MDM: CPT

## 2017-09-23 PROCEDURE — 81000 URINALYSIS NONAUTO W/SCOPE: CPT

## 2017-09-23 PROCEDURE — 81025 URINE PREGNANCY TEST: CPT

## 2017-09-23 NOTE — ED PROVIDER NOTES
History      Chief Complaint   Patient presents with    Abdominal Pain     pt reports lower abd pain; + strong urine odor       Review of patient's allergies indicates:  No Known Allergies     HPI   HPI    9/23/2017, 2:36 AM   History obtained from the patient      History of Present Illness: Ayah Crawley is a 22 y.o. female patient who presents to the Emergency Department for suprapubic pain and strong odor to urine.  She says she has had multiple episodes of UTI's, see's urology, and thinks she has another.  She denies vaginal discharge, flank pain, or f/n/v.   She says she just recently had std screen, normal.  Symptoms are moderate in severity.     No further complaints or concerns at this time.           PCP: Anabela Stephen MD       Past Medical History:  Past Medical History:   Diagnosis Date    Recurrent UTI          Past Surgical History:  No past surgical history on file.        Family History:  Family History   Problem Relation Age of Onset    Breast cancer Maternal Grandmother     Kidney disease Maternal Grandmother     Heart disease Maternal Grandmother     Breast cancer Maternal Aunt     Kidney disease Maternal Aunt     Hypertension Mother     Kidney disease Maternal Aunt            Social History:  Social History     Social History Main Topics    Smoking status: Never Smoker    Smokeless tobacco: Never Used    Alcohol use No    Drug use: No    Sexual activity: Yes     Partners: Male     Birth control/ protection: Injection       ROS     Review of Systems   Constitutional: Negative for chills and fever.   HENT: Negative for sore throat.    Respiratory: Negative for shortness of breath.    Cardiovascular: Negative for chest pain.   Gastrointestinal: Negative for nausea and vomiting.   Genitourinary: Negative for decreased urine volume, dysuria, flank pain, genital sores, vaginal bleeding, vaginal discharge and vaginal pain.   Musculoskeletal: Negative for back pain.   Skin:  "Negative for rash.   Neurological: Negative for weakness.   Hematological: Does not bruise/bleed easily.       Physical Exam      Initial Vitals [09/23/17 0228]   BP Pulse Resp Temp SpO2   110/72 64 18 98.8 °F (37.1 °C) 100 %      MAP       84.67         Physical Exam  Vital signs and nursing notes reviewed.  Constitutional: Patient is in NAD. Awake and alert. Well-developed and well-nourished.  Head: Atraumatic. Normocephalic.  Eyes: PERRL. EOM intact. Conjunctivae nl. No scleral icterus.  ENT: Mucous membranes are moist. Oropharynx is clear.  Neck: Supple. No JVD. No lymphadenopathy.  No meningismus  Cardiovascular: Regular rate and rhythm. No murmurs, rubs, or gallops. Distal pulses are 2+ and symmetric.  Pulmonary/Chest: No respiratory distress. Clear to auscultation bilaterally. No wheezing, rales, or rhonchi.  Abdominal: Soft. Non-distended. No TTP. No rebound, guarding, or rigidity. Good bowel sounds.  Genitourinary: No CVA tenderness  Musculoskeletal: Moves all extremities. No edema.   Skin: Warm and dry.  Neurological: Awake and alert. No acute focal neurological deficits are appreciated.  Psychiatric: Normal affect. Good eye contact. Appropriate in content.      ED Course          Procedures  ED Vital Signs:  Vitals:    09/23/17 0228   BP: 110/72   Pulse: 64   Resp: 18   Temp: 98.8 °F (37.1 °C)   TempSrc: Oral   SpO2: 100%   Weight: 63.5 kg (140 lb)   Height: 5' 3.5" (1.613 m)         Results for orders placed or performed during the hospital encounter of 09/23/17   Urinalysis   Result Value Ref Range    Specimen UA Urine, Clean Catch     Color, UA Yellow Yellow, Straw, June    Appearance, UA Clear Clear    pH, UA 7.0 5.0 - 8.0    Specific Gravity, UA 1.015 1.005 - 1.030    Protein, UA Negative Negative    Glucose, UA Negative Negative    Ketones, UA Negative Negative    Bilirubin (UA) Negative Negative    Occult Blood UA Negative Negative    Nitrite, UA Negative Negative    Urobilinogen, UA Negative <2.0 " EU/dL    Leukocytes, UA Trace (A) Negative   Pregnancy, urine rapid (UPT)   Result Value Ref Range    Preg Test, Ur Negative    Urinalysis Microscopic   Result Value Ref Range    RBC, UA 0 0 - 4 /hpf    WBC, UA 2 0 - 5 /hpf    Bacteria, UA Few (A) None-Occ /hpf    Squam Epithel, UA 2 /hpf    Microscopic Comment SEE COMMENT            Imaging Results:  Imaging Results    None            The Emergency Provider reviewed the vital signs and test results, which are outlined above.    ED Discussion             Medication(s) given in the ER:  Medications - No data to display        Follow-up Information     Anabela Stephen MD In 2 days.    Specialty:  Internal Medicine  Contact information:  [address] [place] [address] [address] Fernando [place] [address] [phone]                       New Prescriptions    No medications on file          Medical Decision Making        All findings were reviewed with the patient/family in detail.   All remaining questions and concerns were addressed at that time.  Patient/family has been counseled regarding the need for follow-up as well as the indication to return to the emergency room should new or worrisome developments occur.        MDM               Clinical Impression:        ICD-10-CM ICD-9-CM   1. Suprapubic pain R10.2 789.09             Danni Meléndez PA-C  09/23/17 0338

## 2017-09-27 ENCOUNTER — TELEPHONE (OUTPATIENT)
Dept: OBSTETRICS AND GYNECOLOGY | Facility: CLINIC | Age: 22
End: 2017-09-27

## 2017-09-27 NOTE — TELEPHONE ENCOUNTER
Informed that prescription has been changed to 90 day supply by pharmacy. Patient verbalized understanding

## 2017-09-27 NOTE — TELEPHONE ENCOUNTER
----- Message from Nakia Payne sent at 9/27/2017  3:07 PM CDT -----  Patient states that she just got a message from [facility] telling her that her insurance will only pay for a 90 day prescription for her birth control.    Call her at [phone].                                    medrano

## 2017-12-19 ENCOUNTER — TELEPHONE (OUTPATIENT)
Dept: OBSTETRICS AND GYNECOLOGY | Facility: CLINIC | Age: 22
End: 2017-12-19

## 2017-12-19 DIAGNOSIS — Z30.011 ENCOUNTER FOR INITIAL PRESCRIPTION OF CONTRACEPTIVE PILLS: ICD-10-CM

## 2017-12-19 NOTE — TELEPHONE ENCOUNTER
Spoke with pt regarding birth control. Informed her that I called the pharmacy and was told that there are no more refills, and the prescription is for a 90 day supply. Informed the pt that I will send a refill request to a provider in clinic, and give her a call back when this is completed. Pt verbalized understanding. Please Advise.

## 2017-12-19 NOTE — TELEPHONE ENCOUNTER
----- Message from Wellington Lee sent at 12/19/2017  3:40 PM CST -----  Contact: Pt   ..1. What is the name of the medication you are requesting? Birth control   2. What is the dose?   3. How do you take the medication? Orally, topically, etc?orally   4. How often do you take this medication? Everyday   5. Do you need a 30 day or 90 day supply? 90 day   6. How many refills are you requesting?   7. What is your preferred pharmacy and location of the pharmacy?   .  [facility]/pharmacy #[id number] - [place] [address]  [address] [address] [place] [address]  Phone: [phone] Fax: [phone]      8. Who can we contact with further questions? Pt at..[phone] (home)

## 2017-12-20 ENCOUNTER — TELEPHONE (OUTPATIENT)
Dept: OBSTETRICS AND GYNECOLOGY | Facility: CLINIC | Age: 22
End: 2017-12-20

## 2017-12-20 RX ORDER — NORGESTIMATE AND ETHINYL ESTRADIOL 0.25-0.035
1 KIT ORAL DAILY
Qty: 90 TABLET | Refills: 3 | Status: SHIPPED | OUTPATIENT
Start: 2017-12-20 | End: 2018-12-20

## 2017-12-20 NOTE — TELEPHONE ENCOUNTER
Called pt and informed her that her refill request has been granted. Pt verbalized understanding.

## 2018-03-01 ENCOUNTER — HOSPITAL ENCOUNTER (EMERGENCY)
Facility: HOSPITAL | Age: 23
Discharge: HOME | End: 2018-03-01
Payer: COMMERCIAL

## 2018-03-01 VITALS
HEART RATE: 68 BPM | SYSTOLIC BLOOD PRESSURE: 127 MMHG | DIASTOLIC BLOOD PRESSURE: 70 MMHG | HEIGHT: 64 IN | RESPIRATION RATE: 16 BRPM | BODY MASS INDEX: 25.78 KG/M2 | OXYGEN SATURATION: 100 % | WEIGHT: 151 LBS | TEMPERATURE: 99 F

## 2018-03-01 DIAGNOSIS — R11.0 NAUSEA: ICD-10-CM

## 2018-03-01 DIAGNOSIS — R30.0 DYSURIA: Primary | ICD-10-CM

## 2018-03-01 LAB
B-HCG UR QL: NEGATIVE
BILIRUB UR QL STRIP: NEGATIVE
CLARITY UR: CLEAR
COLOR UR: YELLOW
GLUCOSE UR QL STRIP: NEGATIVE
HGB UR QL STRIP: ABNORMAL
KETONES UR QL STRIP: NEGATIVE
LEUKOCYTE ESTERASE UR QL STRIP: NEGATIVE
NITRITE UR QL STRIP: NEGATIVE
PH UR STRIP: 6 [PH] (ref 5–8)
PROT UR QL STRIP: NEGATIVE
SP GR UR STRIP: >=1.03 (ref 1–1.03)
URN SPEC COLLECT METH UR: ABNORMAL
UROBILINOGEN UR STRIP-ACNC: NEGATIVE EU/DL

## 2018-03-01 PROCEDURE — 25000003 PHARM REV CODE 250: Performed by: REGISTERED NURSE

## 2018-03-01 PROCEDURE — 99283 EMERGENCY DEPT VISIT LOW MDM: CPT

## 2018-03-01 PROCEDURE — 81025 URINE PREGNANCY TEST: CPT

## 2018-03-01 PROCEDURE — 81003 URINALYSIS AUTO W/O SCOPE: CPT

## 2018-03-01 RX ORDER — ONDANSETRON 4 MG/1
4 TABLET, FILM COATED ORAL EVERY 6 HOURS
Qty: 12 TABLET | Refills: 0 | Status: SHIPPED | OUTPATIENT
Start: 2018-03-01

## 2018-03-01 RX ORDER — ONDANSETRON 4 MG/1
4 TABLET, ORALLY DISINTEGRATING ORAL
Status: COMPLETED | OUTPATIENT
Start: 2018-03-01 | End: 2018-03-01

## 2018-03-01 RX ADMIN — ONDANSETRON 4 MG: 4 TABLET, ORALLY DISINTEGRATING ORAL at 06:03

## 2018-03-02 NOTE — ED NOTES
Patient identifiers verified and correct for Ayah Salinas.    LOC: The patient is awake, alert and aware of environment with an appropriate affect, the patient is oriented x 3 and speaking appropriately.  APPEARANCE: Patient resting comfortably and in no acute distress, patient is clean and well groomed, patient's clothing is properly fastened.  SKIN: The skin is warm and dry, color consistent with ethnicity, patient has normal skin turgor and moist mucus membranes, skin intact, no breakdown or bruising noted.  MUSCULOSKELETAL: Patient moving all extremities spontaneously.  RESPIRATORY: Airway is open and patent, respirations are spontaneous.  CARDIAC: Patient has a normal rate, no periphreal edema noted, capillary refill < 3 seconds.  ABDOMEN: Soft and non tender to palpation.  GENITOURINARY: intermittent pain with urination

## 2018-03-02 NOTE — ED PROVIDER NOTES
SCRIBE #1 NOTE: I, Alma Sepulveda, am scribing for, and in the presence of, Emmanuel Cotton NP. I have scribed the entire note.      History      Chief Complaint   Patient presents with    Dysuria     pt reports on and off dysuria x 1 month        Review of patient's allergies indicates:  No Known Allergies     HPI   HPI    3/1/2018, 6:16 PM   History obtained from the patient      History of Present Illness: Ayah Salinas is a 22 y.o. female patient who presents to the Emergency Department for dysuria which onset gradually 1 month ago. Symptoms are intermittent and moderate in severity. Pt reports having chronic UTI's. No mitigating or exacerbating factors reported. Associated sxs include nausea with certain smells and foul smelling urine. Patient denies any hematuria, urinary frequency, fever, chills, emesis, diarrhea, abd pain, and all other sxs at this time. No prior Tx. No further complaints or concerns at this time.         Arrival mode: Personal vehicle     PCP: Anabela Stephen MD       Past Medical History:  Past Medical History:   Diagnosis Date    Recurrent UTI        Past Surgical History:  Past surgical history reviewed not relevant    Family History:  Family History   Problem Relation Age of Onset    Breast cancer Maternal Grandmother     Kidney disease Maternal Grandmother     Heart disease Maternal Grandmother     Breast cancer Maternal Aunt     Kidney disease Maternal Aunt     Hypertension Mother     Kidney disease Maternal Aunt        Social History:  Social History     Social History Main Topics    Smoking status: Never Smoker    Smokeless tobacco: Never Used    Alcohol use No    Drug use: No    Sexual activity: Yes     Partners: Male     Birth control/ protection: Injection       ROS   Review of Systems   Constitutional: Negative for chills and fever.   HENT: Negative for sore throat.    Respiratory: Negative for shortness of breath.    Cardiovascular: Negative for chest  "pain.   Gastrointestinal: Positive for nausea. Negative for abdominal pain, diarrhea and vomiting.   Genitourinary: Negative for dysuria, frequency, hematuria, vaginal bleeding and vaginal discharge.        (+) odor in urine   Musculoskeletal: Negative for back pain.   Skin: Negative for rash.   Neurological: Negative for weakness.   Hematological: Does not bruise/bleed easily.   All other systems reviewed and are negative.      Physical Exam      Initial Vitals [03/01/18 1811]   BP Pulse Resp Temp SpO2   133/73 65 18 98.9 °F (37.2 °C) 100 %      MAP       93          Physical Exam  Nursing Notes and Vital Signs Reviewed.  Constitutional: Patient is in no acute distress. Well-developed and well-nourished.  Head: Atraumatic. Normocephalic.  Eyes: PERRL. EOM intact. Conjunctivae are not pale. No scleral icterus.  ENT: Mucous membranes are moist. Oropharynx is clear and symmetric.    Neck: Supple. Full ROM. No lymphadenopathy.  Cardiovascular: Regular rate. Regular rhythm. No murmurs, rubs, or gallops. Distal pulses are 2+ and symmetric.  Pulmonary/Chest: No respiratory distress. Clear to auscultation bilaterally. No wheezing or rales.  Abdominal: Soft and non-distended.  There is no tenderness.  No rebound, guarding, or rigidity. Good bowel sounds.  Genitourinary: No CVA tenderness  Musculoskeletal: Moves all extremities. No obvious deformities. No edema. No calf tenderness.  Skin: Warm and dry.  Neurological:  Alert, awake, and appropriate.  Normal speech.  No acute focal neurological deficits are appreciated.  Psychiatric: Normal affect. Good eye contact. Appropriate in content.    ED Course    Procedures  ED Vital Signs:  Vitals:    03/01/18 1811 03/01/18 1913   BP: 133/73 127/70   Pulse: 65 68   Resp: 18 16   Temp: 98.9 °F (37.2 °C)    TempSrc: Oral    SpO2: 100% 100%   Weight: 68.5 kg (151 lb)    Height: 5' 4" (1.626 m)        Abnormal Lab Results:  Labs Reviewed   URINALYSIS - Abnormal; Notable for the " following:        Result Value    Specific Gravity, UA >=1.030 (*)     Occult Blood UA Trace (*)     All other components within normal limits   PREGNANCY TEST, URINE RAPID        All Lab Results:  Results for orders placed or performed during the hospital encounter of 03/01/18   Urinalysis Clean Catch   Result Value Ref Range    Specimen UA Urine, Clean Catch     Color, UA Yellow Yellow, Straw, June    Appearance, UA Clear Clear    pH, UA 6.0 5.0 - 8.0    Specific Gravity, UA >=1.030 (A) 1.005 - 1.030    Protein, UA Negative Negative    Glucose, UA Negative Negative    Ketones, UA Negative Negative    Bilirubin (UA) Negative Negative    Occult Blood UA Trace (A) Negative    Nitrite, UA Negative Negative    Urobilinogen, UA Negative <2.0 EU/dL    Leukocytes, UA Negative Negative   Pregnancy, urine rapid (UPT)   Result Value Ref Range    Preg Test, Ur Negative           The Emergency Provider reviewed the vital signs and test results, which are outlined above.    ED Discussion     7:09 PM: Reassessed pt at this time. Discussed with pt all pertinent ED information and results. Discussed pt dx and plan of tx. Gave pt all f/u and return to the ED instructions. All questions and concerns were addressed at this time. Pt expresses understanding of information and instructions, and is comfortable with plan to discharge. Pt is stable for discharge.        ED Medication(s):  Medications   ondansetron disintegrating tablet 4 mg (4 mg Oral Given 3/1/18 1828)       New Prescriptions    ONDANSETRON (ZOFRAN) 4 MG TABLET    Take 1 tablet (4 mg total) by mouth every 6 (six) hours.       Follow-up Information     Anabela Stephen MD In 3 days.    Specialty:  Internal Medicine  Contact information:  [address] [place] [address] [address] [place] [address] [phone]                     Medical Decision Making    Medical Decision Making:   Clinical Tests:   Lab Tests: Ordered and Reviewed           Scribe Attestation:   Scribe #1: I performed  the above scribed service and the documentation accurately describes the services I performed. I attest to the accuracy of the note.    Attending:   Physician Attestation Statement for Scribe #1: I, Emmanuel Cotton NP, personally performed the services described in this documentation, as scribed by Alma Sepulveda, in my presence, and it is both accurate and complete.          Clinical Impression       ICD-10-CM ICD-9-CM   1. Dysuria R30.0 788.1   2. Nausea R11.0 787.02       Disposition:   Disposition: Discharged  Condition: Stable         Emmanuel Cotton Jr., [facility]  03/01/18 9660

## 2018-03-05 ENCOUNTER — TELEPHONE (OUTPATIENT)
Dept: OBSTETRICS AND GYNECOLOGY | Facility: CLINIC | Age: 23
End: 2018-03-05

## 2018-03-05 NOTE — TELEPHONE ENCOUNTER
Spoke with pt in regards to a change. Informed pt that she will need to come to the clinic with a State Id, or 's license reflecting the name change so that we can get this changed for her. Pt also stated that she was experiencing blood in her urine. Offered pt the opportunity to schedule an appt but she declined.

## 2018-03-05 NOTE — TELEPHONE ENCOUNTER
----- Message from Emily Schaefer sent at 3/5/2018  2:11 PM CST -----  Contact: pt   Pt is calling to refill her birth control  Pt state that RX needs to have the name   Ayah Crawley because that is the name on insurance card ..[phone] (home)   Pt state that she also has blood in her urine      1. What is the name of the medication you are requesting? Estarylla  2. What is the dose? 0.25 mg  3. How do you take the medication? Orally, topically, etc?  Orally  4. How often do you take this medication? 1 daily  5. Do you need a 30 day or 90 day supply? 90  6. How many refills are you requesting? Pre   7. What is your preferred pharmacy and location of the pharmacy?     [facility]/pharmacy #[id number] - [place] - [address]  [address] [address] [place] [address]  Phone: [phone] Fax: [phone]    8. Who can we contact with further questions? Pt

## 2018-03-25 ENCOUNTER — HOSPITAL ENCOUNTER (EMERGENCY)
Facility: HOSPITAL | Age: 23
Discharge: HOME | End: 2018-03-25
Attending: EMERGENCY MEDICINE
Payer: COMMERCIAL

## 2018-03-25 VITALS
RESPIRATION RATE: 17 BRPM | OXYGEN SATURATION: 99 % | HEART RATE: 90 BPM | BODY MASS INDEX: 25.34 KG/M2 | TEMPERATURE: 99 F | HEIGHT: 65 IN | SYSTOLIC BLOOD PRESSURE: 125 MMHG | DIASTOLIC BLOOD PRESSURE: 75 MMHG | WEIGHT: 152.13 LBS

## 2018-03-25 DIAGNOSIS — B34.9 VIRAL SYNDROME: Primary | ICD-10-CM

## 2018-03-25 PROCEDURE — 99283 EMERGENCY DEPT VISIT LOW MDM: CPT

## 2018-03-25 RX ORDER — LORATADINE 10 MG/1
10 TABLET ORAL DAILY
Qty: 60 TABLET | Refills: 0 | COMMUNITY
Start: 2018-03-25 | End: 2019-03-25

## 2018-03-25 RX ORDER — METHYLPREDNISOLONE 4 MG/1
TABLET ORAL
Qty: 1 PACKAGE | Refills: 0 | Status: SHIPPED | OUTPATIENT
Start: 2018-03-25 | End: 2018-04-15

## 2018-03-25 NOTE — ED PROVIDER NOTES
SCRIBE #1 NOTE: I, Corinne Mack, am scribing for, and in the presence of, Kevin Obrien MD. I have scribed the entire note.      History      Chief Complaint   Patient presents with    General Illness     c/o sore throat, cough, sinus drainage, neck, back and chest pain       Review of patient's allergies indicates:  No Known Allergies     HPI   HPI    3/25/2018, 3:21 PM   History obtained from the patient      History of Present Illness: Ayah Salinas is a 22 y.o. female patient who presents to the Emergency Department for a sore throat which onset gradually yesterday. Symptoms are intermittent and moderate in severity.  Pt had similar sxs a few weeks ago, but improved. Pt states that the onset of her sxs started when she moved to her friend's house a few weeks ago. No mitigating or exacerbating factors reported. Associated sxs include cough with green sputum and rhinorrhea. Patient denies any fever, chills, congestion, sinus pain, N/V/D, CP, SOB HA, dizziness, weakness, and all other sxs at this time. No prior Tx reported. No further complaints or concerns at this time.         Arrival mode: Personal vehicle      PCP: Anabela Stephen MD       Past Medical History:  Past Medical History:   Diagnosis Date    Recurrent UTI        Past Surgical History:  No past surgical history on file.      Family History:  Family History   Problem Relation Age of Onset    Breast cancer Maternal Grandmother     Kidney disease Maternal Grandmother     Heart disease Maternal Grandmother     Breast cancer Maternal Aunt     Kidney disease Maternal Aunt     Hypertension Mother     Kidney disease Maternal Aunt        Social History:  Social History     Social History Main Topics    Smoking status: Never Smoker    Smokeless tobacco: Never Used    Alcohol use No    Drug use: No    Sexual activity: Yes     Partners: Male     Birth control/ protection: Injection       ROS   Review of Systems   Constitutional:  Negative for chills and fever.   HENT: Positive for rhinorrhea and sore throat. Negative for congestion, ear pain, sinus pain, sinus pressure and trouble swallowing.    Eyes: Negative for discharge and itching.   Respiratory: Positive for cough (green sputum). Negative for shortness of breath and wheezing.    Gastrointestinal: Negative for abdominal pain, nausea and vomiting.   Genitourinary: Negative for dysuria, hematuria and menstrual problem.   Musculoskeletal: Negative for back pain and neck pain.   Allergic/Immunologic: Negative for environmental allergies.   Neurological: Negative for dizziness, numbness and headaches.   All other systems reviewed and are negative.      Physical Exam      Initial Vitals [03/25/18 1414]   BP Pulse Resp Temp SpO2   125/75 90 17 98.9 °F (37.2 °C) 99 %      MAP       91.67          Physical Exam  Nursing Notes and Vital Signs Reviewed.  Constitutional: Patient is in no apparent distress. Well-developed and well-nourished.  Head: Atraumatic. Normocephalic.  Eyes: PERRL. EOM intact. Conjunctivae are not pale. No scleral icterus.  ENT: Mucous membranes are moist. Oropharynx is clear and symmetric.    Neck: Supple. Full ROM. No lymphadenopathy.  Cardiovascular: Regular rate. Regular rhythm. No murmurs, rubs, or gallops. Distal pulses are 2+ and symmetric.  Pulmonary/Chest: No respiratory distress. Clear to auscultation bilaterally. No wheezing or rales.  Abdominal: Soft and non-distended.  There is no tenderness.  No rebound, guarding, or rigidity.   Musculoskeletal: Moves all extremities. No obvious deformities. No edema. No calf tenderness.  Skin: Warm and dry.  Neurological:  Alert, awake, and appropriate.  Normal speech.  No acute focal neurological deficits are appreciated.  Psychiatric: Normal affect. Good eye contact. Appropriate in content.    ED Course    Procedures  ED Vital Signs:  Vitals:    03/25/18 1414   BP: 125/75   Pulse: 90   Resp: 17   Temp: 98.9 °F (37.2 °C)  "  TempSrc: Oral   SpO2: 99%   Weight: 69 kg (152 lb 1.9 oz)   Height: 5' 5" (1.651 m)       Abnormal Lab Results:  Labs Reviewed - No data to display     All Lab Results:  Results for orders placed or performed during the hospital encounter of 03/01/18   Urinalysis Clean Catch   Result Value Ref Range    Specimen UA Urine, Clean Catch     Color, UA Yellow Yellow, Straw, June    Appearance, UA Clear Clear    pH, UA 6.0 5.0 - 8.0    Specific Gravity, UA >=1.030 (A) 1.005 - 1.030    Protein, UA Negative Negative    Glucose, UA Negative Negative    Ketones, UA Negative Negative    Bilirubin (UA) Negative Negative    Occult Blood UA Trace (A) Negative    Nitrite, UA Negative Negative    Urobilinogen, UA Negative <2.0 EU/dL    Leukocytes, UA Negative Negative   Pregnancy, urine rapid (UPT)   Result Value Ref Range    Preg Test, Ur Negative          Imaging Results:  Imaging Results    None                 The Emergency Provider reviewed the vital signs and test results, which are outlined above.    ED Discussion     3:36 PM: Discussed pt dx and plan of tx. Gave pt all f/u and return to the ED instructions. All questions and concerns were addressed at this time. Pt expresses understanding of information and instructions, and is comfortable with plan to discharge. Pt is stable for discharge.    I discussed with patient and/or family/caretaker that evaluation in the ED does not suggest any emergent or life threatening medical conditions requiring immediate intervention beyond what was provided in the ED, and I believe patient is safe for discharge.  Regardless, an unremarkable evaluation in the ED does not preclude the development or presence of a serious of life threatening condition. As such, patient was instructed to return immediately for any worsening or change in current symptoms.    Patient presents with upper respiratory and flulike symptoms. Based on my assessment in the ED, I do not suspect any respiratory, " airway, pulmonary, cardiovascular (including myocarditis), metabolic, CNS, medical, or surgical emergency medical condition. I have discussed with the patient and/or caregiver signs and symptoms for secondary bacterial infections, such as pneumonia. I believe that the patient's symptoms are most consistent with a viral illness, possibly influenza. Patient is safe for discharge home with conservative therapy.          ED Medication(s):  Medications - No data to display    New Prescriptions    LORATADINE (CLARITIN) 10 MG TABLET    Take 1 tablet (10 mg total) by mouth once daily.    METHYLPREDNISOLONE (MEDROL DOSEPACK) 4 MG TABLET    Taper as directed       Follow-up Information     Anabela Stephen MD. Call in 2 days.    Specialty:  Internal Medicine  Contact information:  [address] [facility] [facility] [address] [place] [address] [phone]             [facility]    Specialty:  Emergency Medicine  Why:  If symptoms worsen  Contact information:  [address] [facility] [address] [phone]                   Medical Decision Making              Scribe Attestation:   Scribe #1: I performed the above scribed service and the documentation accurately describes the services I performed. I attest to the accuracy of the note.    Attending:   Physician Attestation Statement for Scribe #1: I, Kevin Obrien MD, personally performed the services described in this documentation, as scribed by Corinne Mack, in my presence, and it is both accurate and complete.          Clinical Impression       ICD-10-CM ICD-9-CM   1. Viral syndrome B34.9 079.99       Disposition:   Disposition: Discharged  Condition: Stable         Kevin Obrien MD  04/03/18 9614

## 2018-06-05 ENCOUNTER — TELEPHONE (OUTPATIENT)
Dept: OBSTETRICS AND GYNECOLOGY | Facility: CLINIC | Age: 23
End: 2018-06-05

## 2018-06-05 NOTE — TELEPHONE ENCOUNTER
----- Message from Janeth Taveras sent at 6/5/2018  9:45 AM CDT -----  Contact: self/[phone]  Would like to consult with nurse regarding changing her birth control, please call back at [phone]. Thanks/ar

## 2018-06-05 NOTE — TELEPHONE ENCOUNTER
Spoke with pt states her ocp were changed when she picked them up from the pharmacy. Notified pt that sometimes the pharmacy changes the pills if they do not have the same one in stock. Advised to call pharmacy to see if they can change her back to the prescription she was previously given. Patient verbalized understanding

## 2018-11-21 ENCOUNTER — TELEPHONE (OUTPATIENT)
Dept: INTERNAL MEDICINE | Facility: CLINIC | Age: 23
End: 2018-11-21

## 2018-11-21 NOTE — TELEPHONE ENCOUNTER
"Spoke to pt, explained to pt that she hasn't been seen since 07/25/2017, and she would need to schedule an appt before she can get her birth control refilled. Pt asks why didn't we tell her that sooner, and proceeded to say " don't worry about, it, I don't even live out there so I will go somewhere else.  "

## 2018-11-21 NOTE — TELEPHONE ENCOUNTER
----- Message from Marcelle Burrows sent at 11/21/2018  2:28 PM CST -----  Contact: Pt  Pt request call back to verify if Birth control RX was called in to Ms Alexandru Garcia..[phone] (home)

## 2018-11-21 NOTE — TELEPHONE ENCOUNTER
Pt states she needs a refill on her Birth control sent to [facility] in [place], [place]. She is out of town on vacation.

## 2023-10-07 NOTE — TELEPHONE ENCOUNTER
----- Message from Julianne Bryson sent at 7/17/2017  4:41 PM CDT -----  Would like to speak to nurse about birth control. Please call back at [phone]  
----- Message from Veronika Magaña sent at 7/17/2017  3:03 PM CDT -----  Contact: pt  Please call pt at [phone] re some symptoms she is having and if they might be a result of her b/c and if so then she will be interested in changing her b/c method and need to be advised and rescheduled accordingly.    
Notified pt that she needs pap, pt refused that apt stated that when ever she come in for and apt the rx are never sent in and feels the apt is a waist of time ...paige   
Spoke with the patient and she would like to switch to a birth control pill. Since she has been on the depo provera injections over the past years she bleeds all the time, she is having horrible mood swings, no sex drive. Her Boss and her family has noticed the mood changes. Patient states that she never had any problems with this before starting birth control. I verified the pharmacy and informed the patient I would send a message over to Mrs Albright to see if she can send in a birth control pill to her pharmacy.  
none